# Patient Record
Sex: FEMALE | Race: WHITE | NOT HISPANIC OR LATINO | URBAN - METROPOLITAN AREA
[De-identification: names, ages, dates, MRNs, and addresses within clinical notes are randomized per-mention and may not be internally consistent; named-entity substitution may affect disease eponyms.]

---

## 2018-09-10 ENCOUNTER — APPOINTMENT (RX ONLY)
Dept: URBAN - METROPOLITAN AREA CLINIC 91 | Facility: CLINIC | Age: 58
Setting detail: DERMATOLOGY
End: 2018-09-10

## 2018-09-10 DIAGNOSIS — Z72.0 TOBACCO USE: ICD-10-CM

## 2018-09-10 DIAGNOSIS — Z85.828 PERSONAL HISTORY OF OTHER MALIGNANT NEOPLASM OF SKIN: ICD-10-CM

## 2018-09-10 DIAGNOSIS — D22 MELANOCYTIC NEVI: ICD-10-CM

## 2018-09-10 DIAGNOSIS — L82.0 INFLAMED SEBORRHEIC KERATOSIS: ICD-10-CM

## 2018-09-10 PROBLEM — K21.9 GASTRO-ESOPHAGEAL REFLUX DISEASE WITHOUT ESOPHAGITIS: Status: ACTIVE | Noted: 2018-09-10

## 2018-09-10 PROBLEM — I48.91 UNSPECIFIED ATRIAL FIBRILLATION: Status: ACTIVE | Noted: 2018-09-10

## 2018-09-10 PROBLEM — D48.5 NEOPLASM OF UNCERTAIN BEHAVIOR OF SKIN: Status: ACTIVE | Noted: 2018-09-10

## 2018-09-10 PROBLEM — I10 ESSENTIAL (PRIMARY) HYPERTENSION: Status: ACTIVE | Noted: 2018-09-10

## 2018-09-10 PROBLEM — D22.9 MELANOCYTIC NEVI, UNSPECIFIED: Status: ACTIVE | Noted: 2018-09-10

## 2018-09-10 PROBLEM — L85.3 XEROSIS CUTIS: Status: ACTIVE | Noted: 2018-09-10

## 2018-09-10 PROBLEM — E13.9 OTHER SPECIFIED DIABETES MELLITUS WITHOUT COMPLICATIONS: Status: ACTIVE | Noted: 2018-09-10

## 2018-09-10 PROBLEM — J30.1 ALLERGIC RHINITIS DUE TO POLLEN: Status: ACTIVE | Noted: 2018-09-10

## 2018-09-10 PROCEDURE — 11100: CPT | Mod: 59

## 2018-09-10 PROCEDURE — ? COUNSELING

## 2018-09-10 PROCEDURE — ? LIQUID NITROGEN

## 2018-09-10 PROCEDURE — ? PRESCRIPTION

## 2018-09-10 PROCEDURE — 17110 DESTRUCTION B9 LES UP TO 14: CPT

## 2018-09-10 PROCEDURE — ? BIOPSY BY SHAVE METHOD

## 2018-09-10 PROCEDURE — ? ADDITIONAL NOTES

## 2018-09-10 PROCEDURE — ? SUNSCREEN RECOMMENDATIONS

## 2018-09-10 PROCEDURE — 99203 OFFICE O/P NEW LOW 30 MIN: CPT | Mod: 25

## 2018-09-10 RX ORDER — MUPIROCIN 20 MG/G
OINTMENT TOPICAL
Qty: 1 | Refills: 1 | Status: ERX | COMMUNITY
Start: 2018-09-10

## 2018-09-10 RX ADMIN — MUPIROCIN: 20 OINTMENT TOPICAL at 20:03

## 2018-09-10 ASSESSMENT — LOCATION SIMPLE DESCRIPTION DERM
LOCATION SIMPLE: RIGHT FOREARM
LOCATION SIMPLE: RIGHT FOREARM

## 2018-09-10 ASSESSMENT — LOCATION ZONE DERM
LOCATION ZONE: ARM
LOCATION ZONE: ARM

## 2018-09-10 ASSESSMENT — LOCATION DETAILED DESCRIPTION DERM
LOCATION DETAILED: RIGHT DISTAL DORSAL FOREARM
LOCATION DETAILED: RIGHT DISTAL DORSAL FOREARM

## 2018-09-10 NOTE — HPI: SKIN LESIONS
How Severe Is Your Skin Lesion?: moderate
Have Your Skin Lesions Been Treated?: not been treated
Is This A New Presentation, Or A Follow-Up?: Skin Lesion
Which Family Member (Optional)?: Dad (SCC)

## 2018-09-10 NOTE — PROCEDURE: BIOPSY BY SHAVE METHOD
Curettage Text: The wound bed was treated with curettage after the biopsy was performed.
Hemostasis: Aluminum Chloride
Electrodesiccation Text: The wound bed was treated with electrodesiccation after the biopsy was performed.
Was A Bandage Applied: Yes
Depth Of Biopsy: dermis
Silver Nitrate Text: The wound bed was treated with silver nitrate after the biopsy was performed.
Destruction After The Procedure: No
Type Of Destruction Used: Curettage
Biopsy Type: H and E
Size Of Lesion In Cm: 1
Notification Instructions: Patient may RTC in 2 weeks if they would like to receive biopsy results in person.  Otherwise, patient is instructed to call the office if not contacted within 2  - 3 weeks.
Dressing: Band-Aid
Cryotherapy Text: The wound bed was treated with cryotherapy after the biopsy was performed.
X Size Of Lesion In Cm: 0
Detail Level: Detailed
Biopsy Method: Dermablade
Electrodesiccation And Curettage Text: The wound bed was treated with electrodesiccation and curettage after the biopsy was performed.
Post-Care Instructions: I reviewed with the patient in detail post-care instructions. Patient is to keep the biopsy site dry overnight, and then apply bactroban twice daily until healed. Patient may apply hydrogen peroxide soaks to remove any crusting.
Anesthesia Type: 2% lidocaine without epinephrine
Billing Type: Third-Party Bill
Wound Care: Mupirocin
Consent: Written consent was obtained and risks were reviewed including but not limited to scarring, infection, bleeding, scabbing, incomplete removal, nerve damage and allergy to anesthesia.
Anesthesia Volume In Cc (Will Not Render If 0): 0.2
Lab: 3

## 2018-09-10 NOTE — PROCEDURE: ADDITIONAL NOTES
Additional Notes: Tobacco/nicotine dependence is a condition that often requires repeated treatments, but there are helpful treatments and resources for quitting. Smokers can and do quit smoking. In fact, today there are more former smokers than current smokers.  People who stop smoking often start again because of withdrawal symptoms, stress, and weight gain.\\nNicotine withdrawal symptoms may include:4,6\\nFeeling irritable, angry, or anxious\\nHaving trouble thinking\\nCraving tobacco products\\nFeeling hungrier than usual\\n\\nYou are never too old to quit.\\n\\n                              Ways to Quit Smoking\\nMost former smokers quit without using one of the treatments that scientific research has shown can work.  However, the following treatments are proven to be effective for smokers who want help to quit:   Brief help by a doctor (such as when a doctor takes 10 minutes or less to give a patient advice and assistance about quitting)\\nIndividual, group, or telephone counseling\\nBehavioral therapies (such as training in problem solving)\\nTreatments with more person-to-person contact and more intensity (such as more or longer counseling sessions)\\nPrograms to deliver treatments using mobile phones\\nMedications for quitting that have been found to be effective include the following:\\nNicotine replacement products\\nOver-the-counter (nicotine patch [which is also available by prescription], gum, lozenge)\\nPrescription (nicotine patch, inhaler, nasal spray)\\nPrescription non-nicotine medications: bupropion SR (Zyban®), varenicline tartrate (Chantix®)\\nCounseling and medication are both effective for treating tobacco dependence, and using them together is more effective than using either one alone.\\nMore information is needed about quitting for people who smoke cigarettes and also use other types of tobacco.\\n\\nHelpful Resources\\nQuitline Services\\nCall 1-800-QUIT-NOW (7-266-548-2639) if you want help quitting. This is a free telephone support service that can help people who want to stop smoking or using tobacco. Callers are routed to their state quitlines, which offer several types of quit information and services. These may include:\\n\\nFree support, advice, and counseling from experienced quitline coaches\\nA personalized quit plan\\nPractical information on how to quit, including ways to cope with nicotine withdrawal\\nThe latest information about stop-smoking medications\\nFree or discounted medications (available for at least some callers in most states)\\nReferrals to other resources\\nMailed self-help materials\\nOnline Help\\nGet free help online, too.\\n\\nFor information on quitting, go to the Quit Smoking Resources page on CDC’s Smoking & Tobacco Use Web site.\\nRead inspiring stories about former smokers and their reasons for quitting at CDC’s Tips From Former Smokers Web site.\\nThe I’m Ready to Quit! page links to many helpful resources.\\nPublications\\nVisit [employer]’s Online Publications Catalog to order free copies of materials about quitting as well as helpful resources about tobacco use prevention.\\n\\n\\n\\n Additional Notes: Tobacco/nicotine dependence is a condition that often requires repeated treatments, but there are helpful treatments and resources for quitting. Smokers can and do quit smoking. In fact, today there are more former smokers than current smokers.  People who stop smoking often start again because of withdrawal symptoms, stress, and weight gain.\\nNicotine withdrawal symptoms may include:4,6\\nFeeling irritable, angry, or anxious\\nHaving trouble thinking\\nCraving tobacco products\\nFeeling hungrier than usual\\n\\nYou are never too old to quit.\\n\\n                              Ways to Quit Smoking\\nMost former smokers quit without using one of the treatments that scientific research has shown can work.  However, the following treatments are proven to be effective for smokers who want help to quit:   Brief help by a doctor (such as when a doctor takes 10 minutes or less to give a patient advice and assistance about quitting)\\nIndividual, group, or telephone counseling\\nBehavioral therapies (such as training in problem solving)\\nTreatments with more person-to-person contact and more intensity (such as more or longer counseling sessions)\\nPrograms to deliver treatments using mobile phones\\nMedications for quitting that have been found to be effective include the following:\\nNicotine replacement products\\nOver-the-counter (nicotine patch [which is also available by prescription], gum, lozenge)\\nPrescription (nicotine patch, inhaler, nasal spray)\\nPrescription non-nicotine medications: bupropion SR (Zyban®), varenicline tartrate (Chantix®)\\nCounseling and medication are both effective for treating tobacco dependence, and using them together is more effective than using either one alone.\\nMore information is needed about quitting for people who smoke cigarettes and also use other types of tobacco.\\n\\nHelpful Resources\\nQuitline Services\\nCall 1-800-QUIT-NOW (3-325-947-1373) if you want help quitting. This is a free telephone support service that can help people who want to stop smoking or using tobacco. Callers are routed to their state quitlines, which offer several types of quit information and services. These may include:\\n\\nFree support, advice, and counseling from experienced quitline coaches\\nA personalized quit plan\\nPractical information on how to quit, including ways to cope with nicotine withdrawal\\nThe latest information about stop-smoking medications\\nFree or discounted medications (available for at least some callers in most states)\\nReferrals to other resources\\nMailed self-help materials\\nOnline Help\\nGet free help online, too.\\n\\nFor information on quitting, go to the Quit Smoking Resources page on CDC’s Smoking & Tobacco Use Web site.\\nRead inspiring stories about former smokers and their reasons for quitting at CDC’s Tips From Former Smokers Web site.\\nThe I’m Ready to Quit! page links to many helpful resources.\\nPublications\\nVisit [employer]’s Online Publications Catalog to order free copies of materials about quitting as well as helpful resources about tobacco use prevention.\\n\\n\\n\\n

## 2018-09-10 NOTE — PROCEDURE: LIQUID NITROGEN
Number Of Freeze-Thaw Cycles: 2 freeze-thaw cycles
Consent: The patient's consent was obtained including but not limited to risks of crusting, scabbing, blistering, scarring, darker or lighter pigmentary change, recurrence, incomplete removal and infection.  (see signed consent)
Duration Of Freeze Thaw-Cycle (Seconds): 2
Detail Level: Detailed
Add 52 Modifier (Optional): no
Medical Necessity Information: It is in your best interest to select a reason for this procedure from the list below. All of these items fulfill various CMS LCD requirements except the new and changing color options.
Post-Care Instructions: I reviewed with the patient in detail post-care instructions. Patient is to wear sun protection and avoid picking at any of the treated lesions. Pt may apply Vaseline, Polysporin or Mupirocin ointment to crusted or scabbing areas if needed for dry, flaking skin.  Treated or untreated warts should NOT be shaved over because this will cause spreading and require additional treatments.
Medical Necessity Clause: This procedure was medically necessary because the lesions that were treated were:
Aperture Size (Optional): C